# Patient Record
Sex: MALE | URBAN - METROPOLITAN AREA
[De-identification: names, ages, dates, MRNs, and addresses within clinical notes are randomized per-mention and may not be internally consistent; named-entity substitution may affect disease eponyms.]

---

## 2019-08-25 ENCOUNTER — INPATIENT (INPATIENT)
Facility: HOSPITAL | Age: 64
LOS: 1 days | Discharge: HOME | DRG: 305 | End: 2019-08-27
Attending: INTERNAL MEDICINE | Admitting: INTERNAL MEDICINE
Payer: COMMERCIAL

## 2019-08-25 VITALS — WEIGHT: 259.93 LBS

## 2019-08-25 DIAGNOSIS — I16.0 HYPERTENSIVE URGENCY: ICD-10-CM

## 2019-08-25 LAB
ALBUMIN SERPL ELPH-MCNC: 4 G/DL — SIGNIFICANT CHANGE UP (ref 3.3–5)
ALP SERPL-CCNC: 148 U/L — HIGH (ref 40–120)
ALT FLD-CCNC: 31 U/L — SIGNIFICANT CHANGE UP (ref 12–78)
ANION GAP SERPL CALC-SCNC: 7 MMOL/L — SIGNIFICANT CHANGE UP (ref 5–17)
APTT BLD: 25.1 SEC — LOW (ref 27.5–36.3)
AST SERPL-CCNC: 26 U/L — SIGNIFICANT CHANGE UP (ref 15–37)
BILIRUB SERPL-MCNC: 1 MG/DL — SIGNIFICANT CHANGE UP (ref 0.2–1.2)
BUN SERPL-MCNC: 19 MG/DL — SIGNIFICANT CHANGE UP (ref 7–23)
CALCIUM SERPL-MCNC: 8.9 MG/DL — SIGNIFICANT CHANGE UP (ref 8.5–10.1)
CHLORIDE SERPL-SCNC: 110 MMOL/L — HIGH (ref 96–108)
CO2 SERPL-SCNC: 28 MMOL/L — SIGNIFICANT CHANGE UP (ref 22–31)
CREAT SERPL-MCNC: 1 MG/DL — SIGNIFICANT CHANGE UP (ref 0.5–1.3)
GLUCOSE SERPL-MCNC: 92 MG/DL — SIGNIFICANT CHANGE UP (ref 70–99)
HCT VFR BLD CALC: 46.9 % — SIGNIFICANT CHANGE UP (ref 39–50)
HGB BLD-MCNC: 16.8 G/DL — SIGNIFICANT CHANGE UP (ref 13–17)
INR BLD: 0.95 RATIO — SIGNIFICANT CHANGE UP (ref 0.88–1.16)
MAGNESIUM SERPL-MCNC: 2.4 MG/DL — SIGNIFICANT CHANGE UP (ref 1.6–2.6)
MCHC RBC-ENTMCNC: 31.8 PG — SIGNIFICANT CHANGE UP (ref 27–34)
MCHC RBC-ENTMCNC: 35.8 GM/DL — SIGNIFICANT CHANGE UP (ref 32–36)
MCV RBC AUTO: 88.8 FL — SIGNIFICANT CHANGE UP (ref 80–100)
NT-PROBNP SERPL-SCNC: 844 PG/ML — HIGH (ref 0–125)
PLATELET # BLD AUTO: 169 K/UL — SIGNIFICANT CHANGE UP (ref 150–400)
POTASSIUM SERPL-MCNC: 3.8 MMOL/L — SIGNIFICANT CHANGE UP (ref 3.5–5.3)
POTASSIUM SERPL-SCNC: 3.8 MMOL/L — SIGNIFICANT CHANGE UP (ref 3.5–5.3)
PROT SERPL-MCNC: 6.5 GM/DL — SIGNIFICANT CHANGE UP (ref 6–8.3)
PROTHROM AB SERPL-ACNC: 10.5 SEC — SIGNIFICANT CHANGE UP (ref 10–12.9)
RBC # BLD: 5.28 M/UL — SIGNIFICANT CHANGE UP (ref 4.2–5.8)
RBC # FLD: 13.6 % — SIGNIFICANT CHANGE UP (ref 10.3–14.5)
SODIUM SERPL-SCNC: 145 MMOL/L — SIGNIFICANT CHANGE UP (ref 135–145)
TROPONIN I SERPL-MCNC: <0.015 NG/ML — SIGNIFICANT CHANGE UP (ref 0.01–0.04)
TSH SERPL-MCNC: 3.39 UU/ML — SIGNIFICANT CHANGE UP (ref 0.34–4.82)
WBC # BLD: 15.39 K/UL — HIGH (ref 3.8–10.5)
WBC # FLD AUTO: 15.39 K/UL — HIGH (ref 3.8–10.5)

## 2019-08-25 PROCEDURE — 86803 HEPATITIS C AB TEST: CPT

## 2019-08-25 PROCEDURE — G0378: CPT

## 2019-08-25 PROCEDURE — 93010 ELECTROCARDIOGRAM REPORT: CPT | Mod: 76

## 2019-08-25 PROCEDURE — 84443 ASSAY THYROID STIM HORMONE: CPT

## 2019-08-25 PROCEDURE — 85730 THROMBOPLASTIN TIME PARTIAL: CPT

## 2019-08-25 PROCEDURE — 82746 ASSAY OF FOLIC ACID SERUM: CPT

## 2019-08-25 PROCEDURE — 80061 LIPID PANEL: CPT

## 2019-08-25 PROCEDURE — 83735 ASSAY OF MAGNESIUM: CPT

## 2019-08-25 PROCEDURE — 70450 CT HEAD/BRAIN W/O DYE: CPT | Mod: 26

## 2019-08-25 PROCEDURE — 82607 VITAMIN B-12: CPT

## 2019-08-25 PROCEDURE — 97116 GAIT TRAINING THERAPY: CPT | Mod: GP

## 2019-08-25 PROCEDURE — 36415 COLL VENOUS BLD VENIPUNCTURE: CPT

## 2019-08-25 PROCEDURE — 93306 TTE W/DOPPLER COMPLETE: CPT

## 2019-08-25 PROCEDURE — 71045 X-RAY EXAM CHEST 1 VIEW: CPT | Mod: 26

## 2019-08-25 PROCEDURE — 93005 ELECTROCARDIOGRAM TRACING: CPT

## 2019-08-25 PROCEDURE — 85025 COMPLETE CBC W/AUTO DIFF WBC: CPT

## 2019-08-25 PROCEDURE — 83036 HEMOGLOBIN GLYCOSYLATED A1C: CPT

## 2019-08-25 PROCEDURE — 97163 PT EVAL HIGH COMPLEX 45 MIN: CPT | Mod: GP

## 2019-08-25 PROCEDURE — 80053 COMPREHEN METABOLIC PANEL: CPT

## 2019-08-25 PROCEDURE — 85610 PROTHROMBIN TIME: CPT

## 2019-08-25 PROCEDURE — 84484 ASSAY OF TROPONIN QUANT: CPT

## 2019-08-25 RX ORDER — LABETALOL HCL 100 MG
20 TABLET ORAL ONCE
Refills: 0 | Status: COMPLETED | OUTPATIENT
Start: 2019-08-25 | End: 2019-08-25

## 2019-08-25 RX ORDER — SODIUM CHLORIDE 9 MG/ML
1000 INJECTION INTRAMUSCULAR; INTRAVENOUS; SUBCUTANEOUS ONCE
Refills: 0 | Status: COMPLETED | OUTPATIENT
Start: 2019-08-25 | End: 2019-08-25

## 2019-08-25 RX ORDER — LOSARTAN POTASSIUM 100 MG/1
50 TABLET, FILM COATED ORAL DAILY
Refills: 0 | Status: DISCONTINUED | OUTPATIENT
Start: 2019-08-25 | End: 2019-08-27

## 2019-08-25 RX ORDER — ACETAMINOPHEN 500 MG
650 TABLET ORAL EVERY 6 HOURS
Refills: 0 | Status: DISCONTINUED | OUTPATIENT
Start: 2019-08-25 | End: 2019-08-27

## 2019-08-25 RX ORDER — FAMOTIDINE 10 MG/ML
20 INJECTION INTRAVENOUS
Refills: 0 | Status: DISCONTINUED | OUTPATIENT
Start: 2019-08-25 | End: 2019-08-27

## 2019-08-25 RX ORDER — ACETAMINOPHEN 500 MG
2 TABLET ORAL
Qty: 0 | Refills: 0 | DISCHARGE

## 2019-08-25 RX ORDER — HYDRALAZINE HCL 50 MG
10 TABLET ORAL ONCE
Refills: 0 | Status: COMPLETED | OUTPATIENT
Start: 2019-08-25 | End: 2019-08-25

## 2019-08-25 RX ORDER — ATORVASTATIN CALCIUM 80 MG/1
40 TABLET, FILM COATED ORAL AT BEDTIME
Refills: 0 | Status: DISCONTINUED | OUTPATIENT
Start: 2019-08-25 | End: 2019-08-27

## 2019-08-25 RX ORDER — ACETAMINOPHEN AND CHLORPHENIRAMINE MALEATE 325; 2 MG/1; MG/1
1 TABLET ORAL
Qty: 0 | Refills: 0 | DISCHARGE

## 2019-08-25 RX ORDER — ASPIRIN/CALCIUM CARB/MAGNESIUM 324 MG
325 TABLET ORAL ONCE
Refills: 0 | Status: DISCONTINUED | OUTPATIENT
Start: 2019-08-25 | End: 2019-08-25

## 2019-08-25 RX ORDER — ACETAMINOPHEN 500 MG
1 TABLET ORAL
Qty: 0 | Refills: 0 | DISCHARGE

## 2019-08-25 RX ORDER — METOPROLOL TARTRATE 50 MG
100 TABLET ORAL DAILY
Refills: 0 | Status: DISCONTINUED | OUTPATIENT
Start: 2019-08-26 | End: 2019-08-27

## 2019-08-25 RX ADMIN — Medication 20 MILLIGRAM(S): at 15:24

## 2019-08-25 RX ADMIN — Medication 20 MILLIGRAM(S): at 16:09

## 2019-08-25 RX ADMIN — Medication 10 MILLIGRAM(S): at 14:48

## 2019-08-25 RX ADMIN — SODIUM CHLORIDE 1000 MILLILITER(S): 9 INJECTION INTRAMUSCULAR; INTRAVENOUS; SUBCUTANEOUS at 14:48

## 2019-08-25 RX ADMIN — ATORVASTATIN CALCIUM 40 MILLIGRAM(S): 80 TABLET, FILM COATED ORAL at 18:52

## 2019-08-25 RX ADMIN — LOSARTAN POTASSIUM 50 MILLIGRAM(S): 100 TABLET, FILM COATED ORAL at 18:52

## 2019-08-25 NOTE — PHARMACOTHERAPY INTERVENTION NOTE - COMMENTS
Medication history completed, verified with patient, [facility] over the phone and doctor first

## 2019-08-25 NOTE — H&P ADULT - HISTORY OF PRESENT ILLNESS
Pt is a 62 yo male with a pmh/o Burkitts lymphoma in remission for 31 yrs s/p chemotherapy, ITP in current exacerbation on steroid taper for past two weeks, HTN. Pt states yesterday at around 3pm he began to experience 'dizziness' which is described as being unsteady, having to hold onto walls due to feeling as if he was going to fall to side. Pt states he later began to have pain behind his right eye for which he took Coricidin q as he thought he had sinusitis which was putting his equilibrium off and causing eye pain. Pt states the episode of eye pain lasted four hours and the dizziness continued into this morning prompting a phone call to physician and ED visit thereafter. Pt on arrival had bp of 201/130. Pt states at around noon he took an extra dose of his BP med (usually takes it qhs and took it before bed last night). Pt required several doses of IV labetalol and hydralazine. Pt states dizziness has now resolved. Pt denies having any paresthesias, nausea, abd pain, HA, blurry vision, speech changes, weakness, chest pain, palpitations, dysuria, cough, congestion, fever.

## 2019-08-25 NOTE — ED PROVIDER NOTE - NS_ ATTENDINGSCRIBEDETAILS _ED_A_ED_FT
I, Edwar Sanchez MD,  performed the initial face to face bedside interview with this patient regarding history of present illness, review of symptoms and relevant past medical, social and family history.  I completed an independent physical examination.    The history, relevant review of systems, past medical and surgical history, medical decision making, and physical examination was documented by the scribe in my presence and I attest to the accuracy of the documentation.

## 2019-08-25 NOTE — ED ADULT NURSE NOTE - OBJECTIVE STATEMENT
Patient presents to ED complaining of dizziness. Patient complaining of dizziness x 1 day. Patient states he had a sharp headache behind r eye lasting for 4 hours yesterday, immediately following patient became dizzy. Patient complaining of unsteady gait. Patient hypertensive on arrival. Patient states he took an extra metoprolol today. Patient currently treated for ITP with prednisone. Patient denies CP, SOB, fever, chills, NVD. A&0x3

## 2019-08-25 NOTE — ED ADULT NURSE NOTE - NSIMPLEMENTINTERV_GEN_ALL_ED
Implemented All Fall Risk Interventions:  [place] to call system. Call bell, personal items and telephone within reach. Instruct patient to call for assistance. Room bathroom lighting operational. Non-slip footwear when patient is off stretcher. Physically safe environment: no spills, clutter or unnecessary equipment. Stretcher in lowest position, wheels locked, appropriate side rails in place. Provide visual cue, wrist band, yellow gown, etc. Monitor gait and stability. Monitor for mental status changes and reorient to person, place, and time. Review medications for side effects contributing to fall risk. Reinforce activity limits and safety measures with patient and family.

## 2019-08-25 NOTE — H&P ADULT - ATTENDING COMMENTS
16 min spent discussing advanced care planning and pt is full code.  Wife is medical decision maker should he be unable.

## 2019-08-25 NOTE — ED PROVIDER NOTE - OBJECTIVE STATEMENT
64 y/o male with a PMHx of HTN, ITP on Prednisone presents to the ED c/o dizziness. Pt notes dizziness worsens when standing. Pt reports having a pain behind his right eye yesterday, then resolved. Denies CP, SOB, leg swelling. No hx of SVT or Afib. No recent travel. Nonsmoker. No other complaints at this time.

## 2019-08-25 NOTE — H&P ADULT - NSICDXFAMILYHX_GEN_ALL_CORE_FT
FAMILY HISTORY:  Family history of bone cancer, in father,   Family history of ITP, in aunt  FH: stomach cancer, in mother

## 2019-08-25 NOTE — ED ADULT TRIAGE NOTE - CHIEF COMPLAINT QUOTE
pt c/o dizziness for 2 days, pt states he also had right eye pain last night that's subsided, no visual changes or headache. pt denies chest pain or SOB. pt on steroids for ITP and has hx of HTN,

## 2019-08-25 NOTE — H&P ADULT - NSICDXPASTMEDICALHX_GEN_ALL_CORE_FT
PAST MEDICAL HISTORY:  Burkitts lymphoma     HTN (hypertension)     Idiopathic thrombocytopenic purpura (ITP)

## 2019-08-25 NOTE — H&P ADULT - ASSESSMENT
Pt is a 64 yo male with a pmh/o Burkitts lymphoma in remission for 31 yrs s/p chemotherapy, ITP in current exacerbation, HTN, admitted for HTN emergency with sx concerning for TIA.    1) HTN emergency  With gait instability concerning for TIA-Resolved  Admit to Telemetry  Continue with current AntiHTN regimen, ARB increased (therapeutic interchange 40mg ->50mg or 25mg->50mg option ordered)  Monitor renal function closely given increase in ARB  Given 1L IVF bolus in ED  CT head with no acute findings  MR/MRA brain/head/neck pending  Neurochecks q 4  lipid panel and HbA1c  TTE ordered  Statin started  ASA held as pt with ITP in current exacerbation and   Intermittent pneumatic devices for DVT prophylaxis and hold chemical AC   Troponin wnl , continue to trend  EKG reviewed, no STT elevation  Cardiology and Neurology consulted  Fall precautions  PT consult    2) Acute on chronic ITP exacerbation  Resolving  cont prednisone 20mg po daily  monitor plts  has f/u with hematology for consideration of taper  Dr. Jerez office to be notified of admission    3) Reactive leukocytosis  no s/s of active infection  likely due to steroid use  f/u cbc with diff in AM  Chest x ray neg  f/u U/A    4) Elevated BnP and Alk Phos  Likely reactive given HTN  f/u AM CMP  f/u TTE  no s/s of fluid overload

## 2019-08-26 DIAGNOSIS — I47.1 SUPRAVENTRICULAR TACHYCARDIA: ICD-10-CM

## 2019-08-26 DIAGNOSIS — I16.0 HYPERTENSIVE URGENCY: ICD-10-CM

## 2019-08-26 DIAGNOSIS — D69.3 IMMUNE THROMBOCYTOPENIC PURPURA: ICD-10-CM

## 2019-08-26 LAB
ADD ON TEST-SPECIMEN IN LAB: SIGNIFICANT CHANGE UP
ALBUMIN SERPL ELPH-MCNC: 3.8 G/DL — SIGNIFICANT CHANGE UP (ref 3.3–5)
ALP SERPL-CCNC: 130 U/L — HIGH (ref 40–120)
ALT FLD-CCNC: 25 U/L — SIGNIFICANT CHANGE UP (ref 12–78)
ANION GAP SERPL CALC-SCNC: 6 MMOL/L — SIGNIFICANT CHANGE UP (ref 5–17)
APTT BLD: 24.4 SEC — LOW (ref 27.5–36.3)
AST SERPL-CCNC: 14 U/L — LOW (ref 15–37)
BASOPHILS # BLD AUTO: 0.02 K/UL — SIGNIFICANT CHANGE UP (ref 0–0.2)
BASOPHILS NFR BLD AUTO: 0.2 % — SIGNIFICANT CHANGE UP (ref 0–2)
BILIRUB SERPL-MCNC: 0.9 MG/DL — SIGNIFICANT CHANGE UP (ref 0.2–1.2)
BUN SERPL-MCNC: 17 MG/DL — SIGNIFICANT CHANGE UP (ref 7–23)
CALCIUM SERPL-MCNC: 8.8 MG/DL — SIGNIFICANT CHANGE UP (ref 8.5–10.1)
CHLORIDE SERPL-SCNC: 109 MMOL/L — HIGH (ref 96–108)
CHOLEST SERPL-MCNC: 175 MG/DL — SIGNIFICANT CHANGE UP (ref 10–199)
CO2 SERPL-SCNC: 30 MMOL/L — SIGNIFICANT CHANGE UP (ref 22–31)
CREAT SERPL-MCNC: 0.92 MG/DL — SIGNIFICANT CHANGE UP (ref 0.5–1.3)
EOSINOPHIL # BLD AUTO: 0.01 K/UL — SIGNIFICANT CHANGE UP (ref 0–0.5)
EOSINOPHIL NFR BLD AUTO: 0.1 % — SIGNIFICANT CHANGE UP (ref 0–6)
FOLATE SERPL-MCNC: 19 NG/ML — SIGNIFICANT CHANGE UP
GLUCOSE SERPL-MCNC: 90 MG/DL — SIGNIFICANT CHANGE UP (ref 70–99)
HBA1C BLD-MCNC: 4.6 % — SIGNIFICANT CHANGE UP (ref 4–5.6)
HCT VFR BLD CALC: 45.2 % — SIGNIFICANT CHANGE UP (ref 39–50)
HCV AB S/CO SERPL IA: 0.02 S/CO — SIGNIFICANT CHANGE UP (ref 0–0.99)
HCV AB SERPL-IMP: SIGNIFICANT CHANGE UP
HDLC SERPL-MCNC: 67 MG/DL — SIGNIFICANT CHANGE UP
HGB BLD-MCNC: 15.5 G/DL — SIGNIFICANT CHANGE UP (ref 13–17)
IMM GRANULOCYTES NFR BLD AUTO: 0.6 % — SIGNIFICANT CHANGE UP (ref 0–1.5)
INR BLD: 0.96 RATIO — SIGNIFICANT CHANGE UP (ref 0.88–1.16)
LIPID PNL WITH DIRECT LDL SERPL: 88 MG/DL — SIGNIFICANT CHANGE UP
LYMPHOCYTES # BLD AUTO: 1.16 K/UL — SIGNIFICANT CHANGE UP (ref 1–3.3)
LYMPHOCYTES # BLD AUTO: 10.6 % — LOW (ref 13–44)
MCHC RBC-ENTMCNC: 31.1 PG — SIGNIFICANT CHANGE UP (ref 27–34)
MCHC RBC-ENTMCNC: 34.3 GM/DL — SIGNIFICANT CHANGE UP (ref 32–36)
MCV RBC AUTO: 90.6 FL — SIGNIFICANT CHANGE UP (ref 80–100)
MONOCYTES # BLD AUTO: 0.55 K/UL — SIGNIFICANT CHANGE UP (ref 0–0.9)
MONOCYTES NFR BLD AUTO: 5 % — SIGNIFICANT CHANGE UP (ref 2–14)
NEUTROPHILS # BLD AUTO: 9.16 K/UL — HIGH (ref 1.8–7.4)
NEUTROPHILS NFR BLD AUTO: 83.5 % — HIGH (ref 43–77)
PLATELET # BLD AUTO: 150 K/UL — SIGNIFICANT CHANGE UP (ref 150–400)
POTASSIUM SERPL-MCNC: 4.1 MMOL/L — SIGNIFICANT CHANGE UP (ref 3.5–5.3)
POTASSIUM SERPL-SCNC: 4.1 MMOL/L — SIGNIFICANT CHANGE UP (ref 3.5–5.3)
PROT SERPL-MCNC: 6.1 GM/DL — SIGNIFICANT CHANGE UP (ref 6–8.3)
PROTHROM AB SERPL-ACNC: 10.6 SEC — SIGNIFICANT CHANGE UP (ref 10–12.9)
RBC # BLD: 4.99 M/UL — SIGNIFICANT CHANGE UP (ref 4.2–5.8)
RBC # FLD: 13.8 % — SIGNIFICANT CHANGE UP (ref 10.3–14.5)
SODIUM SERPL-SCNC: 145 MMOL/L — SIGNIFICANT CHANGE UP (ref 135–145)
TOTAL CHOLESTEROL/HDL RATIO MEASUREMENT: 2.6 RATIO — LOW (ref 3.4–9.6)
TRIGL SERPL-MCNC: 99 MG/DL — SIGNIFICANT CHANGE UP (ref 10–149)
TSH SERPL-MCNC: 2.99 UU/ML — SIGNIFICANT CHANGE UP (ref 0.34–4.82)
VIT B12 SERPL-MCNC: 362 PG/ML — SIGNIFICANT CHANGE UP (ref 232–1245)
WBC # BLD: 10.97 K/UL — HIGH (ref 3.8–10.5)
WBC # FLD AUTO: 10.97 K/UL — HIGH (ref 3.8–10.5)

## 2019-08-26 PROCEDURE — 93010 ELECTROCARDIOGRAM REPORT: CPT

## 2019-08-26 PROCEDURE — 99223 1ST HOSP IP/OBS HIGH 75: CPT

## 2019-08-26 PROCEDURE — 93306 TTE W/DOPPLER COMPLETE: CPT | Mod: 26

## 2019-08-26 RX ORDER — AMLODIPINE BESYLATE 2.5 MG/1
5 TABLET ORAL ONCE
Refills: 0 | Status: COMPLETED | OUTPATIENT
Start: 2019-08-26 | End: 2019-08-26

## 2019-08-26 RX ORDER — AMLODIPINE BESYLATE 2.5 MG/1
5 TABLET ORAL DAILY
Refills: 0 | Status: DISCONTINUED | OUTPATIENT
Start: 2019-08-27 | End: 2019-08-27

## 2019-08-26 RX ADMIN — Medication 1 TABLET(S): at 11:48

## 2019-08-26 RX ADMIN — AMLODIPINE BESYLATE 5 MILLIGRAM(S): 2.5 TABLET ORAL at 14:39

## 2019-08-26 RX ADMIN — FAMOTIDINE 20 MILLIGRAM(S): 10 INJECTION INTRAVENOUS at 17:49

## 2019-08-26 RX ADMIN — Medication 20 MILLIGRAM(S): at 04:33

## 2019-08-26 RX ADMIN — ATORVASTATIN CALCIUM 40 MILLIGRAM(S): 80 TABLET, FILM COATED ORAL at 21:41

## 2019-08-26 RX ADMIN — Medication 40 MILLIGRAM(S): at 10:59

## 2019-08-26 RX ADMIN — Medication 100 MILLIGRAM(S): at 04:33

## 2019-08-26 RX ADMIN — FAMOTIDINE 20 MILLIGRAM(S): 10 INJECTION INTRAVENOUS at 04:33

## 2019-08-26 NOTE — CONSULT NOTE ADULT - PROBLEM SELECTOR RECOMMENDATION 9
possibly his symptoms related to uncontrolled hypertension , SVT ,  which is improving ,  Patient  was advised to be compliant to low salt diet , increase losartan to 100 mg po daily , check orthostatic bp ,  neurology evaluation. echo , TSH level

## 2019-08-26 NOTE — PHYSICAL THERAPY INITIAL EVALUATION ADULT - REHAB POTENTIAL, PT EVAL
Pt independent with amb, transfers, and mobility on steps and is not a skilled acute care setting PT candidate. Pt can amb with floor care, D/C PT.

## 2019-08-27 ENCOUNTER — TRANSCRIPTION ENCOUNTER (OUTPATIENT)
Age: 64
End: 2019-08-27

## 2019-08-27 VITALS — DIASTOLIC BLOOD PRESSURE: 88 MMHG | SYSTOLIC BLOOD PRESSURE: 153 MMHG | HEART RATE: 67 BPM

## 2019-08-27 PROCEDURE — 99233 SBSQ HOSP IP/OBS HIGH 50: CPT

## 2019-08-27 PROCEDURE — 93306 TTE W/DOPPLER COMPLETE: CPT | Mod: 26

## 2019-08-27 RX ORDER — LOSARTAN POTASSIUM 100 MG/1
50 TABLET, FILM COATED ORAL ONCE
Refills: 0 | Status: COMPLETED | OUTPATIENT
Start: 2019-08-27 | End: 2019-08-27

## 2019-08-27 RX ORDER — METOPROLOL TARTRATE 50 MG
3 TABLET ORAL
Qty: 90 | Refills: 0
Start: 2019-08-27 | End: 2019-09-25

## 2019-08-27 RX ORDER — AMLODIPINE BESYLATE 2.5 MG/1
1 TABLET ORAL
Qty: 30 | Refills: 1
Start: 2019-08-27 | End: 2019-10-25

## 2019-08-27 RX ORDER — METOPROLOL TARTRATE 50 MG
1 TABLET ORAL
Qty: 0 | Refills: 0 | DISCHARGE

## 2019-08-27 RX ORDER — METOPROLOL TARTRATE 50 MG
50 TABLET ORAL DAILY
Refills: 0 | Status: DISCONTINUED | OUTPATIENT
Start: 2019-08-27 | End: 2019-08-27

## 2019-08-27 RX ORDER — LOSARTAN POTASSIUM 100 MG/1
1 TABLET, FILM COATED ORAL
Qty: 30 | Refills: 1
Start: 2019-08-27 | End: 2019-10-25

## 2019-08-27 RX ORDER — AMLODIPINE BESYLATE 2.5 MG/1
5 TABLET ORAL ONCE
Refills: 0 | Status: COMPLETED | OUTPATIENT
Start: 2019-08-27 | End: 2019-08-27

## 2019-08-27 RX ORDER — METOPROLOL TARTRATE 50 MG
50 TABLET ORAL ONCE
Refills: 0 | Status: COMPLETED | OUTPATIENT
Start: 2019-08-27 | End: 2019-08-27

## 2019-08-27 RX ORDER — AZILSARTAN KAMEDOXOMIL 40 MG/1
1 TABLET ORAL
Qty: 0 | Refills: 0 | DISCHARGE

## 2019-08-27 RX ORDER — LOSARTAN POTASSIUM 100 MG/1
100 TABLET, FILM COATED ORAL DAILY
Refills: 0 | Status: DISCONTINUED | OUTPATIENT
Start: 2019-08-27 | End: 2019-08-27

## 2019-08-27 RX ADMIN — FAMOTIDINE 20 MILLIGRAM(S): 10 INJECTION INTRAVENOUS at 06:02

## 2019-08-27 RX ADMIN — AMLODIPINE BESYLATE 5 MILLIGRAM(S): 2.5 TABLET ORAL at 06:02

## 2019-08-27 RX ADMIN — Medication 100 MILLIGRAM(S): at 06:02

## 2019-08-27 RX ADMIN — Medication 40 MILLIGRAM(S): at 06:02

## 2019-08-27 RX ADMIN — Medication 1 TABLET(S): at 11:18

## 2019-08-27 RX ADMIN — LOSARTAN POTASSIUM 50 MILLIGRAM(S): 100 TABLET, FILM COATED ORAL at 06:02

## 2019-08-27 RX ADMIN — AMLODIPINE BESYLATE 5 MILLIGRAM(S): 2.5 TABLET ORAL at 13:34

## 2019-08-27 RX ADMIN — FAMOTIDINE 20 MILLIGRAM(S): 10 INJECTION INTRAVENOUS at 17:06

## 2019-08-27 RX ADMIN — LOSARTAN POTASSIUM 50 MILLIGRAM(S): 100 TABLET, FILM COATED ORAL at 11:50

## 2019-08-27 RX ADMIN — Medication 50 MILLIGRAM(S): at 15:55

## 2019-08-27 NOTE — DISCHARGE NOTE PROVIDER - CARE PROVIDERS DIRECT ADDRESSES
,[email or web address]

## 2019-08-27 NOTE — PROGRESS NOTE ADULT - PROBLEM SELECTOR PLAN 2
initial EKG showed SVT , now in sinus rhythm continue BB , explained to patient about this , follow up in office

## 2019-08-27 NOTE — DISCHARGE NOTE PROVIDER - NSDCCPCAREPLAN_GEN_ALL_CORE_FT
PRINCIPAL DISCHARGE DIAGNOSIS  Diagnosis: Hypertensive urgency  Assessment and Plan of Treatment: start amlodipine in addition to your home meds. follow up outpatient with cardiology in one week.      SECONDARY DISCHARGE DIAGNOSES  Diagnosis: Idiopathic thrombocytopenic purpura (ITP)  Assessment and Plan of Treatment: decrease your prednisone dose to 40mg daily PRINCIPAL DISCHARGE DIAGNOSIS  Diagnosis: Hypertensive urgency  Assessment and Plan of Treatment: start [id number] ; INCREASE your Toprolol to 150mg daily;    Stop Edarbi;  Start Cozaar (Losartan) 100mg.   Follow up with your cardiologist in one week.      SECONDARY DISCHARGE DIAGNOSES  Diagnosis: Idiopathic thrombocytopenic purpura (ITP)  Assessment and Plan of Treatment: decrease your prednisone dose to 40mg daily

## 2019-08-27 NOTE — DISCHARGE NOTE PROVIDER - CARE PROVIDER_API CALL
Palla, Venugopal R (MD)  Cardiovascular Disease; Internal Medicine  [address], [place] [id number]  Phone: [phone]  Fax: [phone]  Follow Up Time:     Shree Jerez)  HematologyOncology; Internal Medicine; Medical Oncology  [address], [address]  [place] [address]  Phone: [phone]  Fax: [phone]  Follow Up Time:

## 2019-08-27 NOTE — DISCHARGE NOTE PROVIDER - HOSPITAL COURSE
Vital Signs Last 24 Hrs    T(C): 36.8 (27 Aug 2019 10:09), Max: 36.8 (27 Aug 2019 10:09)    T(F): 98.2 (27 Aug 2019 10:09), Max: 98.2 (27 Aug 2019 10:09)    HR: 80 (27 Aug 2019 10:09) (80 - 81)    BP: 170/95 (27 Aug 2019 10:09) (165/98 - 170/95)    BP(mean): --    RR: 18 (27 Aug 2019 10:09) (18 - 18)    SpO2: 100% (27 Aug 2019 10:09) (100% - 100%)        HEENT:   pupils equal and reactive, EOMI, no oropharyngeal lesions, erythema, exudates, oral thrush        NECK:   supple, no carotid bruits, no palpable lymph nodes, no thyromegaly        CV:  +S1, +S2, regular, no murmurs or rubs        RESP:   lungs clear to auscultation bilaterally, no wheezing, rales, rhonchi, good air entry bilaterally        BREAST:  not examined        GI:  abdomen soft, non-tender, non-distended, normal BS, no bruits, no abdominal masses, no palpable masses        RECTAL:  not examined        :  not examined        MSK:   normal muscle tone, no atrophy, no rigidity, no contractions        EXT:   no clubbing, no cyanosis, no edema, no calf pain, swelling or erythema        VASCULAR:  pulses equal and symmetric in the upper and lower extremities        NEURO:  AAOX3, no focal neurological deficits, follows all commands, able to move extremities spontaneously        SKIN:  no ulcers, lesions or rashes        26 Aug 2019 07:19        145    |  109    |  17       ----------------------------<  90       4.1     |  30     |  0.92         Ca    8.8        26 Aug 2019 07:19    Mg     2.6       26 Aug 2019 07:19        TPro  6.1    /  Alb  3.8    /  TBili  0.9    /  DBili  x      /  AST  14     /  ALT  25     /  AlkPhos  130    26 Aug 2019 07:19    LIVER FUNCTIONS - ( 26 Aug 2019 07:19 )    Alb: 3.8 g/dL / Pro: 6.1 gm/dL / ALK PHOS: 130 U/L / ALT: 25 U/L / AST: 14 U/L / GGT: x           PT/INR - ( 26 Aug 2019 07:19 )   PT: 10.6 sec;   INR: 0.96 ratio               PTT - ( 26 Aug 2019 07:19 )  PTT:24.4 secCBC Full  -  ( 26 Aug 2019 07:19 )    WBC Count : 10.97 K/uL    Hemoglobin : 15.5 g/dL    Hematocrit : 45.2 %    Platelet Count - Automated : 150 K/uL    Mean Cell Volume : 90.6 fl    Mean Cell Hemoglobin : 31.1 pg    Mean Cell Hemoglobin Concentration : 34.3 gm/dL                Hospital Course:        Pt is a 64 yo male with a pmh/o Burkitts lymphoma in remission for 31 yrs s/p chemotherapy, ITP in current exacerbation, HTN, admitted for HTN emergency with dizziness.        Admitted for HTN emergency. BP was lowered and patient has no symptoms of dizziness since admission. His Bp was elevated on home meds, and therefore new medication amlodipine was initiated. Patient evaluted by cardiology. Patient will follow up outpatient for remainder of care.     He will f/u with Dr. Palla w/ in one week of discharge and his primary care provider in community.

## 2019-08-27 NOTE — DISCHARGE NOTE NURSING/CASE MANAGEMENT/SOCIAL WORK - PATIENT PORTAL LINK FT
You can access the FollowMyHealth Patient Portal offered by [facility] by registering at the following website: [email or web address]. By joining [facility]’s FollowMyHealth portal, you will also be able to view your health information using other applications (apps) compatible with our system.

## 2019-08-27 NOTE — PROGRESS NOTE ADULT - PROBLEM SELECTOR PLAN 1
possibly his symptoms related to uncontrolled hypertension , SVT ,  which is improving , patient had evidence hypertensive heart disease ,dilated aortic root , Patient  was advised to be compliant to low salt diet  . check orthostatic bp ,  neurology evaluation.  will obtain bubble study to rule out PFO , continue ecotrin  , LDL at target     patient

## 2019-08-27 NOTE — PROGRESS NOTE ADULT - SUBJECTIVE AND OBJECTIVE BOX
CHIEF COMPLAINT/diagnosis: HTN urgency    SUBJECTIVE: no complaints    REVIEW OF SYSTEMS:    CONSTITUTIONAL: No weakness, fevers or chills  EYES/ENT: No visual changes;  No vertigo or throat pain   NECK: No pain or stiffness  RESPIRATORY: No cough, wheezing, hemoptysis; No shortness of breath  CARDIOVASCULAR: No chest pain or palpitations  GASTROINTESTINAL: No abdominal or epigastric pain. No nausea, vomiting, or hematemesis; No diarrhea or constipation. No melena or hematochezia.  GENITOURINARY: No dysuria, frequency or hematuria  NEUROLOGICAL: No numbness or weakness  SKIN: No itching, burning, rashes, or lesions   All other review of systems is negative unless indicated above    Vital Signs Last 24 Hrs  T(C): 36.7 (26 Aug 2019 16:56), Max: 36.8 (25 Aug 2019 17:20)  T(F): 98 (26 Aug 2019 16:56), Max: 98.2 (25 Aug 2019 17:20)  HR: 81 (26 Aug 2019 16:56) (62 - 81)  BP: 165/98 (26 Aug 2019 16:56) (155/85 - 179/107)  BP(mean): --  RR: 18 (26 Aug 2019 16:56) (16 - 18)  SpO2: 100% (26 Aug 2019 16:56) (99% - 100%)    I&O's Summary      CAPILLARY BLOOD GLUCOSE          PHYSICAL EXAM:    Constitutional: NAD, awake and alert, well-developed  HEENT: PERR, EOMI, Normal Hearing, MMM  Neck: Soft and supple, No LAD, No JVD  Respiratory: Breath sounds are clear bilaterally, No wheezing, rales or rhonchi  Cardiovascular: S1 and S2, regular rate and rhythm, no Murmurs, gallops or rubs  Gastrointestinal: Bowel Sounds present, soft, nontender, nondistended, no guarding, no rebound  Extremities: No peripheral edema  Vascular: 2+ peripheral pulses  Neurological: A/O x 3, no focal deficits  Musculoskeletal: 5/5 strength b/l upper and lower extremities  Skin: No rashes    MEDICATIONS:  MEDICATIONS  (STANDING):  atorvastatin 40 milliGRAM(s) Oral at bedtime  famotidine    Tablet 20 milliGRAM(s) Oral two times a day  losartan 50 milliGRAM(s) Oral daily  metoprolol succinate  milliGRAM(s) Oral daily  multivitamin 1 Tablet(s) Oral daily      LABS: All Labs Reviewed:                        15.5   10.97 )-----------( 150      ( 26 Aug 2019 07:19 )             45.2     08-26    145  |  109<H>  |  17  ----------------------------<  90  4.1   |  30  |  0.92    Ca    8.8      26 Aug 2019 07:19  Mg     2.6     08-26    TPro  6.1  /  Alb  3.8  /  TBili  0.9  /  DBili  x   /  AST  14<L>  /  ALT  25  /  AlkPhos  130<H>  08-26    PT/INR - ( 26 Aug 2019 07:19 )   PT: 10.6 sec;   INR: 0.96 ratio         PTT - ( 26 Aug 2019 07:19 )  PTT:24.4 sec  CARDIAC MARKERS ( 25 Aug 2019 18:10 )  <0.015 ng/mL / x     / x     / x     / x      CARDIAC MARKERS ( 25 Aug 2019 14:25 )  <0.015 ng/mL / x     / x     / x     / x            Assessment and Plan:     Pt is a 64 yo male with a pmh/o Burkitts lymphoma in remission for 31 yrs s/p chemotherapy, ITP in current exacerbation, HTN, admitted for HTN emergency with sx concerning for TIA.    1) HTN emergency  -c/w home meds > toprolol and losartan, already on high dose  -start amlodipine  -re-eval BP [place]    2) Acute on chronic ITP exacerbation  -on 60mg dialy of prendisone for the past 2.5 weeks for ITP  -d/w his outpatient hematoligst, can taper prednisone to 40mg daily.   -prednisone maybe driving his BP upwards as well.     3) Reactive leukocytosis  no s/s of active infection  likely due to steroid use  f/u cbc with diff in AM  Chest x ray neg  f/u U/A    4) Elevated BnP and Alk Phos  Likely reactive given HTN  f/u AM CMP  f/u TTE  no s/s of fluid overload
HPI:  Pt is a 62 yo male with a pmh/o Burkitts lymphoma in remission for 31 yrs s/p chemotherapy, ITP in current exacerbation on steroid taper for past two weeks, HTN. Pt states day before yesterday at around 3pm he began to experience 'dizziness' which is described as being unsteady, having to hold onto walls due to feeling as if he was going to fall to side these symptoms persisted saturday and sunday . Pt states he later began to have pain behind his right eye for which he took Coricidin q as he thought he had sinusitis which was putting his equilibrium off and causing eye pain. Pt states the episode of eye pain lasted four hours and the dizziness continued into this morning prompting a phone call to physician and ED visit thereafter. Pt on arrival had bp of 201/130.HIS EKG SHOWED  on ekg ,  Pt states at around noon he took an extra dose of his BP med (usually takes it qhs and took it before bed last night). Pt required several doses of IV labetalol and hydralazine. Pt states dizziness has now resolved. Pt denies having any paresthesias, nausea, abd pain, HA, blurry vision, speech changes, weakness, chest pain, palpitations, dysuria, cough, congestion, fever. (  Patient is not very compliant to low salt  in addition he is on high dose prednisone for ITP , usually  his blood pressure is high normal range at home     Patient denies any prior cardiac symptoms , denies exertional symptoms     currently he is feeling fine , blood pressure mild elevated     8/27/19 patient is feeling fine , no dizziness ,  bp still elevated but better ,  denies any chest pain       PAST MEDICAL & SURGICAL HISTORY:  Burkitts lymphoma  Idiopathic thrombocytopenic purpura (ITP)  HTN (hypertension)  No significant past surgical history      MEDICATIONS  (STANDING):  amLODIPine   Tablet 5 milliGRAM(s) Oral daily  atorvastatin 40 milliGRAM(s) Oral at bedtime  famotidine    Tablet 20 milliGRAM(s) Oral two times a day  losartan 50 milliGRAM(s) Oral daily  metoprolol succinate  milliGRAM(s) Oral daily  multivitamin 1 Tablet(s) Oral daily  predniSONE   Tablet 40 milliGRAM(s) Oral daily    MEDICATIONS  (PRN):  acetaminophen   Tablet .. 650 milliGRAM(s) Oral every 6 hours PRN Temp greater or equal to 38C (100.4F), Mild Pain (1 - 3)      REVIEW OF SYSTEMS:  as above  All other review of systems is negative unless indicated above    Vital Signs Last 24 Hrs  T(C): 36.8 (27 Aug 2019 10:09), Max: 36.8 (27 Aug 2019 10:09)  T(F): 98.2 (27 Aug 2019 10:09), Max: 98.2 (27 Aug 2019 10:09)  HR: 80 (27 Aug 2019 10:09) (78 - 81)  BP: 170/95 (27 Aug 2019 10:09) (165/98 - 170/95)  BP(mean): --  RR: 18 (27 Aug 2019 10:09) (17 - 18)  SpO2: 100% (27 Aug 2019 10:09) (100% - 100%)    I&O's Summary    PHYSICAL EXAM:    Constitutional: NAD, awake and alert, well-developed  HEENT: PERR, EOMI,  No oral cyananosis.  Neck:  supple,  No JVD  Respiratory: Breath sounds are clear bilaterally, No wheezing, rales or rhonchi  Cardiovascular: S1 and S2, regular rate and rhythm, no Murmurs, gallops or rubs  Gastrointestinal: Bowel Sounds present, soft, nontender.   Extremities: No peripheral edema. No clubbing or cyanosis.  Vascular: 2+ peripheral pulses  Neurological: A/O x 3, no focal deficits  Musculoskeletal: no calf tenderness.  Skin: No rashes.      LABS: All Labs Reviewed:                                   15.5   10.97 )-----------( 150      ( 26 Aug 2019 07:19 )             45.2     08-26    145  |  109<H>  |  17  ----------------------------<  90  4.1   |  30  |  0.92    Ca    8.8      26 Aug 2019 07:19  Mg     2.6     08-26    TPro  6.1  /  Alb  3.8  /  TBili  0.9  /  DBili  x   /  AST  14<L>  /  ALT  25  /  AlkPhos  130<H>  08-26    CARDIAC MARKERS ( 25 Aug 2019 18:10 )  <0.015 ng/mL / x     / x     / x     / x      CARDIAC MARKERS ( 25 Aug 2019 14:25 )  <0.015 ng/mL / x     / x     / x     / x          LIVER FUNCTIONS - ( 26 Aug 2019 07:19 )  Alb: 3.8 g/dL / Pro: 6.1 gm/dL / ALK PHOS: 130 U/L / ALT: 25 U/L / AST: 14 U/L / GGT: x           PT/INR - ( 26 Aug 2019 07:19 )   PT: 10.6 sec;   INR: 0.96 ratio         PTT - ( 26 Aug 2019 07:19 )  PTT:24.4 sec    08-26 Chol 175 LDL 88 HDL 67 Trig 99      RADIOLOGY/EKG:     EKG  SVT  151     sinus rhythm early transition  LVH        monitor sinus rhythm PVCS    < from: Transthoracic Echocardiogram (08.26.19 @ 11:53) >   Summary     Mild fibrocalcific changes noted to the mitral valve leaflets with   preserved leaflet excursion. Mild posterior mitral annular calcification   noted. EA reversal of the mitral inflow consistent with reduced   compliance   of the left ventricle.   The aortic valveis well visualized, appears mildly sclerotic. Valve   opening seems to be normal. No aortic regurgitation is present.   The tricuspid valve leaflets are well seen and appear thin and pliable   with preserved leaflets excursion. No tricuspid regurgitation noted.   The pulmonic valve is not well seen. No pulmonic regurgitation noted.   The left atrium is mildly dilated.   Left ventricle endocardium was well visualized with preserved systolic   function. Left ventricle size and structure are within normal   limitations.   Estimated ejection fraction is 65% via bi-plane method.   Normal appearing right atrium.   The right ventricle is normal in size, wall thickness, wall motion, and   contractility.   Dilatation of the ascending aortic segment.  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 Color flow doppler indicates a   communication across the atrial septal wall; this finding is consistent   with a left to right shunt. Suggest an agitated saline inject to further   evaluation for a PFO/ASD if clinically indicated.   No evidence of pericardial effusion.    < end of copied text >   reviewed does not appear to have PFO ,

## 2019-08-28 PROBLEM — I10 ESSENTIAL (PRIMARY) HYPERTENSION: Chronic | Status: ACTIVE | Noted: 2019-08-25

## 2019-08-28 PROBLEM — C83.70 BURKITT LYMPHOMA, UNSPECIFIED SITE: Chronic | Status: ACTIVE | Noted: 2019-08-25

## 2019-08-28 PROBLEM — D69.3 IMMUNE THROMBOCYTOPENIC PURPURA: Chronic | Status: ACTIVE | Noted: 2019-08-25

## 2019-08-29 DIAGNOSIS — D72.829 ELEVATED WHITE BLOOD CELL COUNT, UNSPECIFIED: ICD-10-CM

## 2019-08-29 DIAGNOSIS — Z91.11 PATIENT'S NONCOMPLIANCE WITH DIETARY REGIMEN: ICD-10-CM

## 2019-08-29 DIAGNOSIS — Z79.52 LONG TERM (CURRENT) USE OF SYSTEMIC STEROIDS: ICD-10-CM

## 2019-08-29 DIAGNOSIS — C83.70 BURKITT LYMPHOMA, UNSPECIFIED SITE: ICD-10-CM

## 2019-08-29 DIAGNOSIS — I16.1 HYPERTENSIVE EMERGENCY: ICD-10-CM

## 2019-08-29 DIAGNOSIS — D69.3 IMMUNE THROMBOCYTOPENIC PURPURA: ICD-10-CM

## 2019-08-29 DIAGNOSIS — Z92.21 PERSONAL HISTORY OF ANTINEOPLASTIC CHEMOTHERAPY: ICD-10-CM

## 2019-08-29 DIAGNOSIS — I47.1 SUPRAVENTRICULAR TACHYCARDIA: ICD-10-CM

## 2019-08-29 DIAGNOSIS — I10 ESSENTIAL (PRIMARY) HYPERTENSION: ICD-10-CM

## 2019-09-05 ENCOUNTER — NON-APPOINTMENT (OUTPATIENT)
Age: 64
End: 2019-09-05

## 2019-09-05 ENCOUNTER — APPOINTMENT (OUTPATIENT)
Dept: CARDIOLOGY | Facility: CLINIC | Age: 64
End: 2019-09-05
Payer: COMMERCIAL

## 2019-09-05 VITALS
OXYGEN SATURATION: 99 % | HEIGHT: 72 IN | SYSTOLIC BLOOD PRESSURE: 144 MMHG | HEART RATE: 70 BPM | DIASTOLIC BLOOD PRESSURE: 88 MMHG | WEIGHT: 200 LBS | BODY MASS INDEX: 27.09 KG/M2

## 2019-09-05 VITALS — SYSTOLIC BLOOD PRESSURE: 134 MMHG | DIASTOLIC BLOOD PRESSURE: 82 MMHG

## 2019-09-05 DIAGNOSIS — I47.1 SUPRAVENTRICULAR TACHYCARDIA: ICD-10-CM

## 2019-09-05 DIAGNOSIS — Z85.79 PERSONAL HISTORY OF OTHER MALIGNANT NEOPLASMS OF LYMPHOID, HEMATOPOIETIC AND RELATED TISSUES: ICD-10-CM

## 2019-09-05 DIAGNOSIS — R42 DIZZINESS AND GIDDINESS: ICD-10-CM

## 2019-09-05 DIAGNOSIS — D69.3 IMMUNE THROMBOCYTOPENIC PURPURA: ICD-10-CM

## 2019-09-05 DIAGNOSIS — I10 ESSENTIAL (PRIMARY) HYPERTENSION: ICD-10-CM

## 2019-09-05 PROCEDURE — 93000 ELECTROCARDIOGRAM COMPLETE: CPT

## 2019-09-05 PROCEDURE — 99214 OFFICE O/P EST MOD 30 MIN: CPT | Mod: 25

## 2019-09-05 RX ORDER — PREDNISONE 20 MG/1
20 TABLET ORAL DAILY
Refills: 0 | Status: ACTIVE | COMMUNITY

## 2019-09-05 RX ORDER — CHLORTHALIDONE 25 MG/1
25 TABLET ORAL
Qty: 90 | Refills: 0 | Status: ACTIVE | COMMUNITY
Start: 2019-09-04

## 2019-09-05 NOTE — PHYSICAL EXAM
[General Appearance - Well Developed] : well developed [Normal Conjunctiva] : the conjunctiva exhibited no abnormalities [Normal Oral Mucosa] : normal oral mucosa [Normal Jugular Venous A Waves Present] : normal jugular venous A waves present [Respiration, Rhythm And Depth] : normal respiratory rhythm and effort [] : no respiratory distress [Auscultation Breath Sounds / Voice Sounds] : lungs were clear to auscultation bilaterally [Exaggerated Use Of Accessory Muscles For Inspiration] : no accessory muscle use [Lungs Percussion] : the lungs were normal to percussion [Chest Palpation] : palpation of the chest revealed no abnormalities [Heart Rate And Rhythm] : heart rate and rhythm were normal [Murmurs] : no murmurs present [Heart Sounds] : normal S1 and S2 [Arterial Pulses Normal] : the arterial pulses were normal [Edema] : no peripheral edema present [Veins - Varicosity Changes] : no varicosital changes were noted in the lower extremities [Bowel Sounds] : normal bowel sounds [Abdomen Soft] : soft [Abnormal Walk] : normal gait [Nail Clubbing] : no clubbing of the fingernails [Cyanosis, Localized] : no localized cyanosis [Skin Turgor] : normal skin turgor [Skin Color & Pigmentation] : normal skin color and pigmentation [Oriented To Time, Place, And Person] : oriented to person, place, and time

## 2019-09-05 NOTE — HISTORY OF PRESENT ILLNESS
[FreeTextEntry1] : 63 year old male with hx  of NHL treated with chemotherapy 40 years ago , now ITP , HTN who admitted with  dizziness , unsteady gait ,noted to markedly elevated blood pressure , patient EKG in ER showed SVT , patient was admitted , medication were adjusted , patient CT head no stroke, his symptoms improved with controlling blood pressure, patient did have echo showed IAS without shunt on bubble study , \par \par patient came for follow up today denies any new complain , denies any chest pain or shortness of breath ,  his blood pressure improved ,  lipid profile showed normal \par \par Patients CT head showed chronic microvascular disease , patient had normal lipid  profile

## 2019-09-05 NOTE — REVIEW OF SYSTEMS
[Fever] : no fever [Blurry Vision] : no blurred vision [Shortness Of Breath] : no shortness of breath [Cough] : no cough [Abdominal Pain] : no abdominal pain [Urinary Frequency] : no change in urinary frequency [Heartburn] : no heartburn [Joint Pain] : no joint pain [Skin: A Rash] : no rash: [Dizziness] : no dizziness [Confusion] : no confusion was observed [Anxiety] : no anxiety [Easy Bleeding] : no tendency for easy bleeding

## 2019-09-05 NOTE — REASON FOR VISIT
[Follow-Up - From Hospitalization] : follow-up of a recent hospitalization for [Hypertension] : hypertension [Dizziness] : dizziness [Medication Management] : Medication management

## 2019-09-05 NOTE — ASSESSMENT
[FreeTextEntry1] : 63  year old above hx \par \par dizziness , unsteady gait episode  possibly due to uncontrolled hypertension, resolved with controlled blood pressure ,  no evidence of immediate orthostasis changes , normal LVEF \par \par Hypertension  hypertensive heart disease     improved blood pressure , continue low salt diet , current medication , home bp monitor .  \par \par Paroxysmal SVT  at the time of presentation to ER ,converted to sinus rhythm , without recurrence , continue toprol  mg po daily , \par \par Interatrial septal aneurysm without shunt on color doppler and  on injection of agitated saline   continue ecotrin ,\par \par advised the patient to follow up with neurology , \par \par will obtain exercise nuclear stress test , follow up after \par \par

## 2019-09-23 ENCOUNTER — MEDICATION RENEWAL (OUTPATIENT)
Age: 64
End: 2019-09-23

## 2019-09-29 DIAGNOSIS — R94.31 ABNORMAL ELECTROCARDIOGRAM [ECG] [EKG]: ICD-10-CM

## 2019-09-30 ENCOUNTER — MEDICATION RENEWAL (OUTPATIENT)
Age: 64
End: 2019-09-30

## 2019-09-30 ENCOUNTER — APPOINTMENT (OUTPATIENT)
Dept: CARDIOLOGY | Facility: CLINIC | Age: 64
End: 2019-09-30
Payer: COMMERCIAL

## 2019-09-30 PROCEDURE — 93015 CV STRESS TEST SUPVJ I&R: CPT

## 2019-09-30 RX ORDER — METOPROLOL SUCCINATE 50 MG/1
50 TABLET, EXTENDED RELEASE ORAL
Qty: 270 | Refills: 1 | Status: DISCONTINUED | COMMUNITY
Start: 2019-08-27 | End: 2019-09-30

## 2019-10-04 PROBLEM — R94.31 ABNORMAL ELECTROCARDIOGRAM DURING EXERCISE STRESS TEST: Status: ACTIVE | Noted: 2019-10-04

## 2019-10-24 ENCOUNTER — MEDICATION RENEWAL (OUTPATIENT)
Age: 64
End: 2019-10-24

## 2019-10-24 ENCOUNTER — APPOINTMENT (OUTPATIENT)
Dept: CARDIOLOGY | Facility: CLINIC | Age: 64
End: 2019-10-24
Payer: COMMERCIAL

## 2019-10-24 PROCEDURE — 78452 HT MUSCLE IMAGE SPECT MULT: CPT

## 2019-10-24 PROCEDURE — A9500: CPT

## 2019-10-24 PROCEDURE — 93015 CV STRESS TEST SUPVJ I&R: CPT

## 2019-10-24 RX ORDER — LOSARTAN POTASSIUM 100 MG/1
100 TABLET, FILM COATED ORAL
Qty: 90 | Refills: 1 | Status: ACTIVE | COMMUNITY
Start: 2019-08-27 | End: 1900-01-01

## 2019-10-29 ENCOUNTER — MEDICATION RENEWAL (OUTPATIENT)
Age: 64
End: 2019-10-29

## 2019-10-29 ENCOUNTER — RX RENEWAL (OUTPATIENT)
Age: 64
End: 2019-10-29

## 2019-10-29 RX ORDER — AMLODIPINE BESYLATE 10 MG/1
10 TABLET ORAL
Qty: 90 | Refills: 1 | Status: ACTIVE | COMMUNITY
Start: 2019-04-01 | End: 1900-01-01

## 2019-12-05 ENCOUNTER — APPOINTMENT (OUTPATIENT)
Dept: CARDIOLOGY | Facility: CLINIC | Age: 64
End: 2019-12-05

## 2020-05-11 ENCOUNTER — RX RENEWAL (OUTPATIENT)
Age: 65
End: 2020-05-11

## 2020-05-11 RX ORDER — METOPROLOL SUCCINATE 50 MG/1
50 TABLET, EXTENDED RELEASE ORAL AT BEDTIME
Qty: 270 | Refills: 1 | Status: ACTIVE | COMMUNITY
Start: 2019-09-30 | End: 1900-01-01

## 2021-09-21 NOTE — ED ADULT TRIAGE NOTE - ESI TRIAGE ACUITY LEVEL, MLM
Liver enzymes are now normal  Restart atorvastatin  Recheck hepatic panel in 2 weeks please  Alexandra Shukla MD    
2

## 2021-12-21 ENCOUNTER — NON-APPOINTMENT (OUTPATIENT)
Age: 66
End: 2021-12-21

## 2022-12-19 NOTE — ED PROVIDER NOTE - CROS ED NEURO POS
Please return for any new, worsening or persistent symptoms that are concerning. Take all medications as prescribed and follow up with your doctor or the person you have been referred to today. Thank you for allowing us to participate in your care today!     lease make sure you follow-up with a primary care provider for recheck of your blood pressure.  It was elevated today.  Please do not skip any doses of your lisinopril.  Your noted to be constipation on your x-ray.  Please take MiraLax 1 capful until you have a bowel movement.  If no relief in 3-4 hours take a 2nd cap full.  Take a stool softener daily like Colace.  Please take meloxicam as needed for your sciatica and follow-up with her primary care provider.    
dizziness